# Patient Record
(demographics unavailable — no encounter records)

---

## 2025-05-15 NOTE — CARDIOLOGY SUMMARY
[de-identified] : 2014 holter nsr [de-identified] : 7/2020 s/e  ef 66% 100%mphr 7 mets  6 min [de-identified] : 2018 ef 66% trace mr/tr

## 2025-05-15 NOTE — HISTORY OF PRESENT ILLNESS
[FreeTextEntry1] : Ms Ovalle is a new patient to me today. She waw Dr Lazaro cardiology in 2019 for edema , htn.  She is concerned about her family history and ankle edema. She saw a neurologist yesterday for numbness and tingling of her hands.  She denies chest pain, dyspnea, palpitations or syncope.  She doesn't exercise.